# Patient Record
Sex: FEMALE | Race: WHITE
[De-identification: names, ages, dates, MRNs, and addresses within clinical notes are randomized per-mention and may not be internally consistent; named-entity substitution may affect disease eponyms.]

---

## 2017-01-30 ENCOUNTER — HOSPITAL ENCOUNTER (OUTPATIENT)
Dept: HOSPITAL 45 - C.RDSM | Age: 72
Discharge: HOME | End: 2017-01-30
Attending: ORTHOPAEDIC SURGERY
Payer: COMMERCIAL

## 2017-01-30 DIAGNOSIS — M25.511: Primary | ICD-10-CM

## 2017-03-04 ENCOUNTER — HOSPITAL ENCOUNTER (OUTPATIENT)
Dept: HOSPITAL 45 - C.LAB | Age: 72
Discharge: HOME | End: 2017-03-04
Attending: INTERNAL MEDICINE
Payer: COMMERCIAL

## 2017-03-04 DIAGNOSIS — E89.0: Primary | ICD-10-CM

## 2017-03-04 LAB — TSH SERPL-ACNC: 0.07 UIU/ML (ref 0.3–4.5)

## 2017-03-20 ENCOUNTER — HOSPITAL ENCOUNTER (EMERGENCY)
Dept: HOSPITAL 45 - C.EDB | Age: 72
Discharge: HOME | End: 2017-03-20
Payer: COMMERCIAL

## 2017-03-20 VITALS
BODY MASS INDEX: 23.75 KG/M2 | BODY MASS INDEX: 23.75 KG/M2 | HEIGHT: 64.02 IN | HEIGHT: 64.02 IN | WEIGHT: 139.11 LBS | WEIGHT: 139.11 LBS

## 2017-03-20 VITALS — DIASTOLIC BLOOD PRESSURE: 86 MMHG | SYSTOLIC BLOOD PRESSURE: 146 MMHG | OXYGEN SATURATION: 97 % | HEART RATE: 93 BPM

## 2017-03-20 VITALS — TEMPERATURE: 98.24 F

## 2017-03-20 DIAGNOSIS — M85.871: ICD-10-CM

## 2017-03-20 DIAGNOSIS — M79.671: ICD-10-CM
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
## 2017-03-20 DIAGNOSIS — S92.334A: ICD-10-CM
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
## 2017-03-20 DIAGNOSIS — S92.324A: Primary | ICD-10-CM

## 2017-03-20 DIAGNOSIS — S92.344A: ICD-10-CM

## 2017-03-20 NOTE — EMERGENCY ROOM VISIT NOTE
ED Visit Note


First contact with patient:  08:55


CHIEF COMPLAINT:  Right foot pain.





HISTORY OF PRESENT ILLNESS:  Ms. Hernandez is a 72-year old white female who is 

brought via wheelchair into the ED complaining of right foot pain.  


She reports approximately 10-12 hours ago she was walking in a parking lot 

towards her car.  She tripped over read by nissa that was sticking out of the 

ground and injured her foot.  She reports since that time she has been having 

pain over the second, fourth and fifth metatarsals.  She reports at rest it is 

achy and with ambulation and sharp.  She rates her discomfort 2/10 at rest and 6

/10 with ambulation.  Her pain also worsens with palpation.  She has not 

identified any alleviating factors related to the pain.  She reports she has 

used ice and Tylenol with minimal relief of her discomfort.


She denies any associated symptoms including knee pain, lower leg pain, ankle 

pain, worsening leg numbness/tingling, leg/foot weakness.





REVIEW OF SYSTEMS: As noted above.


  


PAST MEDICAL HISTORY: Unspecified urinary problems, kidney stones, thyroidectomy

, acid reflux, rheumatoid arthritis and multiple bony fractures.  


CURRENT MEDICATIONS:  








 Medications  Dose


 Route/Sig


 Max Daily Dose Days Date Category Dose


Instructions


 


 Benadryl Allergy


  (Diphenhydramine


 Hcl) 25 Mg Tab  12.5 Mg


 PO HS


    3/20/17 Reported 


 


 Zoloft


  (Sertraline HCl)


 100 Mg Tab  100 Mg


 PO DAILY


    3/20/17 Reported 


 


 Vitamin D


  (Cholecalciferol)


 2,000 Unit Tab  2,000 Units


 PO DAILY


    3/20/17 Reported 


 


 Synthroid


  (Levothyroxine


 Sodium) 88 Mcg Tab  88 Mcg


 PO DAILY


    3/20/17 Reported 


 


 Calcium 600 Mg Tab  600 Mg


 PO DAILY


    3/20/17 Reported 


 


 Sudafed


  (Pseudoephedrine


 HCl) 30 Mg Tab  30 Mg


 PO QAM PRN


    8/2/16 Reported 


 


 Miralax


  (Polyethylene


 Glycol 3350) 1


 Pow Pow  17 Gm


 PO QAM


    8/2/16 Reported 


 


 Co Q10 (Coenzyme


 Q10) 90 Mg Tab  1 Tab


 PO NOON


    8/2/16 Reported 


 


 Krill Oil 1 Cap


 Cap  1 Cap


 PO NOON


    8/2/16 Reported 


 


 Vitamin E 200


 Unit Cap  200 Inter.unit


 PO NOON


    7/26/16 Reported 


 


 Celecoxib 200 Mg


 Cap  200 Mg


 PO NOON


    7/26/16 Reported  Restart in 3 days if urine clear


 


 Methotrexate 2.5


 Mg Tab  15 Mg


 PO WK


    9/5/14 Reported  TAKE THIS MEDICATION ONCE A WEEK ON TUESDAYS


 


 Vitamin D 1000


 Unit


  (Cholecalciferol)


 1,000 Unit Cap  1,000 Inter.unit


 PO NOON


    9/5/14 Reported 


 


 Acidophilus


  (Lactobacillus) 1


 Cap Cap  1 Cap


 PO NOON


    9/5/14 Reported 


 


 Dexilant


  (Dexlansoprazole)


 60 Mg Cap  60 Mg


 PO HS


    9/5/14 Reported 


 


 Simvastatin 20 Mg


 Tab  20 Mg


 PO HS


    9/5/14 Reported 


 


 Folic Acid 1 Mg


 Tab  1 Mg


 PO NOON


    9/5/14 Reported 


 


 Prednisone 5 Mg


 Tab  1 Tab


 PO BID


    9/5/14 Reported 








ALLERGIES TO MEDICATIONS:  NSAIDs, narcotics, sulfa.


SOCIAL HISTORY:   Patient is currently employed; she feels safe in her home 

environment; she denies tobacco and alcohol use.





PHYSICAL EXAM: 


Vital Signs: 








  Date Time  Temp Pulse Resp B/P Pulse Ox O2 Delivery O2 Flow Rate FiO2


 


3/20/17 10:34  93 16 146/86 97   


 


3/20/17 08:46 36.8 94 18 146/83 97 Room Air  





General: 72 year old female in mild distress due to pain, nontoxic-appearing, 

afebrile and hemodynamically stable.


Neurological: Awake, alert, oriented to person place and time.  Answering 

questions appropriately and following commands.


Skin: Warm dry and pink.  No soft tissue injuries.


Right Lower Extremity:  No gross bart deformities.  No tenderness in the knee, 

lower leg or ankle.  Moderate tenderness over the second, third and fourth 

metatarsals without bony deformity or crepitus but there is swelling and 

ecchymosis.  She was able to easily wiggle her toes. Throughout the foot the 

skin is pink and warm with brisk capillary refill.  Able to distinguish light 

sensations through all dermatomes of the foot.





ED COURSE:  


Patient is assessed as noted above.


Right Foot X-Rays: Were read by myself and the radiologist showing slightly 

angulated fractures within the neck of the second, third and fourth metatarsals.


Patient is given 1 g of Tylenol by mouth and ice for pain, swelling and comfort.


Patient is placed in a walking boot and is instructed on her walker use.


Patient is educated about her condition and instructed on her treatment plan; 

she verbalizes understanding and agreement with the our plan.





CLINICAL IMPRESSION:  Fractures of the right second, third and fourth 

metatarsals.





DISPOSITION: Patient is discharged to home in stable condition; prior to 

departure she was reassessed and subjectively reported she was feeling better 

and rated her discomfort 2/10.





PLAN:


Comfort measures were discussed with the patient.


Patient was encouraged to follow-up with an orthopedic physician for definitive 

care and treatment.


Patient was encouraged to return emergency department as needed for increasing 

pain or swelling, foot weakness/numbness/tingling or any new/concerning 

symptoms.

## 2017-03-20 NOTE — DIAGNOSTIC IMAGING REPORT
RIGHT FOOT 3 VIEWS



HISTORY:      FOOT PAIN

Right



COMPARISON: None.



FINDINGS: Small plantar heel spur. Tiny ossific density adjacent to the anterior

tibial plafond favors an old avulsion injury. Dorsal soft tissue swelling within

the forefoot. The bones are osteopenic. The Lisfranc joint remains intact.

Slightly angulated fractures within the necks of the second through fourth

metatarsals. No dislocation. Deformity within the proximal phalanx of the fourth

toe favors an old, healed fracture. No radiopaque foreign bodies.



IMPRESSION:  

Slightly angulated fractures within the necks of the second through fourth

metatarsals.







Electronically signed by:  Maik Alvarado M.D.

3/20/2017 9:38 AM



Dictated Date/Time:  3/20/2017 9:36 AM

## 2017-04-11 ENCOUNTER — HOSPITAL ENCOUNTER (OUTPATIENT)
Dept: HOSPITAL 45 - C.MAMM | Age: 72
Discharge: HOME | End: 2017-04-11
Attending: OBSTETRICS & GYNECOLOGY
Payer: COMMERCIAL

## 2017-04-11 DIAGNOSIS — Z12.31: Primary | ICD-10-CM

## 2017-04-11 DIAGNOSIS — N63: ICD-10-CM

## 2017-04-12 NOTE — MAMMOGRAPHY REPORT
BILATERAL DIGITAL SCREENING MAMMOGRAM TOMOSYNTHESIS WITH CAD: 4/11/2017

CLINICAL HISTORY: Routine screening examination.  





TECHNIQUE: Bilateral breast tomosynthesis in addition to standard 2D mammography was performed. Curr
ent study was also evaluated with a Computer Aided Detection (CAD) system.  



COMPARISON: Comparison is made to exams dated:  4/6/2016 mammogram, 4/6/2015 mammogram, 10/19/2015 m
ammogram, 4/17/2015 ultrasound, 4/2/2014 mammogram, and 3/29/2013 mammogram - [facility].   



BREAST COMPOSITION:  There are scattered areas of fibroglandular density in both breasts.  



FINDINGS:  There is a newly visualized lobulated 11 mm mass in the anterior upper outer left breast,
 and a newly visualized lobulated 9 mm mass in the approximate 9:00 middle one third of the left iftikhar
ast, for which additional targeted ultrasound and possible additional mammographic views are recomme
nded, although they could represent cysts.



There are moderate vascular calcifications and scattered benign rim calcifications in the breasts. N
o other suspicious mass, architectural distortion or cluster of microcalcifications is seen.  



IMPRESSION:  ACR BI-RADS CATEGORY 0: INCOMPLETE EVALUATION:  NEED ADDITIONAL IMAGING EVALUATION

The two newly visualized masses in the left breast need additional imaging evaluation.

The patient will be called to schedule an appointment.  





Approximately 10% of breast cancers are not detected with mammography. A negative mammographic repor
t should not delay biopsy if a clinically suggestive mass is present.



Vanessa Greenwood M.D.          

ay/:4/11/2017 17:45:07  



Imaging Technologist: Azra CHRISTINA(R)(M), [facility]

letter sent: Addl Imaging 0  

BI-RADS Code: ACR BI-RADS Category 0: Incomplete Evaluation:  Need Additional Imaging Evaluation

## 2017-04-17 ENCOUNTER — HOSPITAL ENCOUNTER (OUTPATIENT)
Dept: HOSPITAL 45 - C.ULTRBC | Age: 72
Discharge: HOME | End: 2017-04-17
Attending: UROLOGY
Payer: COMMERCIAL

## 2017-04-17 DIAGNOSIS — N81.6: Primary | ICD-10-CM

## 2017-04-17 NOTE — DIAGNOSTIC IMAGING REPORT
RENAL ULTRASOUND



HISTORY: Pain N81.6 [id number]



COMPARISON:  None.



FINDINGS:



Right kidney: Maximum dimension 11.0 cm. 1 cm lower pole renal calcification.

Several small right renal cyst. 5 cm cyst in the interpolar region of the right

kidney. Normal corticomedullary differentiation and cortical thickness.



Left kidney:  Maximum dimension 10.5 cm. No evidence for hydronephrosis. Normal

corticomedullary differentiation and cortical thickness.



Bladder: No bladder wall thickening. The bilateral ureteral jets were

identified.



IMPRESSION:  



1. No evidence for hydronephrosis.

2. 1 cm nonobstructing lower pole right renal calcification.





3. 5 cm right renal parapelvic cyst.







Electronically signed by:  Flaco De M.D.

4/17/2017 7:49 AM



Dictated Date/Time:  4/17/2017 7:47 AM

## 2017-04-24 ENCOUNTER — HOSPITAL ENCOUNTER (OUTPATIENT)
Dept: HOSPITAL 45 - C.MAMM | Age: 72
Discharge: HOME | End: 2017-04-24
Attending: OBSTETRICS & GYNECOLOGY
Payer: COMMERCIAL

## 2017-04-24 DIAGNOSIS — N63: Primary | ICD-10-CM

## 2017-04-24 DIAGNOSIS — Z80.3: ICD-10-CM

## 2017-04-24 DIAGNOSIS — Z80.41: ICD-10-CM

## 2017-04-24 DIAGNOSIS — N60.02: ICD-10-CM

## 2017-04-24 NOTE — MAMMOGRAPHY REPORT
ULTRASOUND OF BOTH BREASTS: 4/24/2017

CLINICAL HISTORY: 72-year-old woman called back from screening mammography for two newly visualized 
masses within the left breast.  Family history of breast cancer = 2 sisters.  Also another sister wi
th ovarian cancer.  





COMPARISON: Comparison is made to exams dated:  4/11/2017 mammogram, 4/6/2016 mammogram, 4/6/2015 ma
mmogram, 4/2/2014 mammogram, 3/29/2013 mammogram, and 3/23/2012 mammogram - [facility]
nter.   



FINDINGS: Targeted ultrasound was performed in the approximate 1:00 to 2:00 and 9:00 to 10:00 axes o
f the left breast, to evaluate for the newly visualized mammographic masses.  In the 1:00 periareola
r left breast, there is a mixed echogenicity predominantly hypoechoic solid-appearing mass measuring
 11.8 x 8.8 x 10.4 mm.  This correlates with the newly visualized mass in the anterior aspect of the
 left breast.  This is indeterminate, warranting further evaluation with tissue sampling.  In the 10
:00 left breast, 2 cm from the nipple, there is a parallel lobulated anechoic cystic mass measuring 
5.7 x 2.8 x 7.6 mm.  This correlates with the second mammographic mass, and is benign.  Sonographic 
evaluation was also performed in the left axilla.  Morphologically normal lymph nodes are seen witho
ut a suspicious mass or suspicious adenopathy.





IMPRESSION: ACR BI-RADS CATEGORY 4B: INTERMEDIATE SUSPICION FOR MALIGNANCY - FOLLOW-UP RECOMMENDED

1.  Ultrasound guided core needle biopsy is recommended for a mixed echogenicity 11.8 mm mass in the
 1:00 periareolar left breast, thought to correlate with one of the newly visualized mammographic ma
sses.

2.  The second newly visualized mammographic mass in the 9:00 to 10:00 left breast correlates with a
 benign anechoic simple cyst on ultrasound.

3.  No suspicious left axillary lymphadenopathy is seen on ultrasound.



These results and recommendations were discussed with the patient at the time of the exam.  She tent
atively scheduled the biopsy prior to leaving our department.



Vanessa Greenwood M.D.  

ay/:4/24/2017 11:48:54  



Imaging Technologist: Dr. Vanessa Greenwood, [facility]

letter sent: Abnormal 4/5  

BI-RADS Code: ACR BI-RADS Category 4B: Intermediate Suspicion For Malignancy

## 2017-04-25 ENCOUNTER — HOSPITAL ENCOUNTER (OUTPATIENT)
Dept: HOSPITAL 45 - C.MAMM | Age: 72
Discharge: HOME | End: 2017-04-25
Attending: OBSTETRICS & GYNECOLOGY
Payer: COMMERCIAL

## 2017-04-25 ENCOUNTER — HOSPITAL ENCOUNTER (OUTPATIENT)
Dept: HOSPITAL 45 - C.LABSPEC | Age: 72
Discharge: HOME | End: 2017-04-25
Attending: UROLOGY
Payer: COMMERCIAL

## 2017-04-25 DIAGNOSIS — N63: Primary | ICD-10-CM

## 2017-04-25 DIAGNOSIS — N39.46: ICD-10-CM

## 2017-04-25 DIAGNOSIS — N20.0: Primary | ICD-10-CM

## 2017-04-25 NOTE — MAMMOGRAPHY REPORT
UNILATERAL LEFT DIGITAL DIAGNOSTIC MAMMOGRAM TOMOSYNTHESIS: 4/25/2017

CLINICAL HISTORY: Indeterminate mixed echogenicity solid mass in the 1:00 periareolar left breast.  
Patient presented for ultrasound-guided core needle biopsy.  



Please refer to the report from left breast ultrasound guided core biopsy performed at the same time
 for full detail.



IMPRESSION:  POST PROCEDURE IMAGING FOR MARKER PLACEMENT

Please refer to the report from left breast ultrasound guided core biopsy performed at the same time
 for full detail.



Approximately 10% of breast cancers are not detected with mammography. A negative mammographic repor
t should not delay biopsy if a clinically suggestive mass is present.



Vanessa Greenwood M.D.          

ay/:4/25/2017 15:58:14  



Imaging Technologist: Bassam CHRISTINA(R)(M), [facility]



BI-RADS Code: Post Procedure Imaging For Marker Placement

## 2017-04-25 NOTE — DISCHARGE INSTRUCTIONS
Discharge Instructions


Procedure


Procedure Date:


Apr 25, 2017.


Reason for visit:


Left Mass.





Discharge


Discharge Date:


Apr 25, 2017.


Discharge Diagnosis:


post left breast ultrasound guided core biopsy





Instructions


Activity Recommendations:  Additional Limitations (see below)


Return to School/Work:  no limitations


Recommended Home Diet:  No Limitations


Provider Instructions:





ACTIVITY RECOMMENDATIONS:





*  No lifting, pushing, pulling or exercising the affected side for three days.








RETURN TO SCHOOL/WORK:





*  You may return to work/school after the procedure, but do not perform any 

strenuous


   activities for 24 to 48 hours.








MEDICATIONS:





*  Tylenol (two 325 mg) every four to six hours if needed for mild pain (if not 

allergic to Tylenol).








DIET:





*  Resume previous diet.








SPECIAL CARE INSTRUCTIONS:





*  Keep biopsy site dry for 24 hours.  May shower after 24 hours, but do not 

soak (bathe)


   incision.





*  May remove Tegaderm (plastic patch) tomorrow AFTER showering.





*  Leave the steri-strips on for one week.  Allow the steri-strips to fall off 

by themselves.  


   If not off after one week, you may remove them.  You may place a Bandaid


   crosswise over the strips, if desired.





*  Apply ice 10 minutes on and 10 minutes off as needed.





*  Wear a bra at bedtime to sleep more comfortably for 2-3 days.





*  Your referring physician should have the results after approximately 5 to 7 

business days.





*  Call for unusual bleeding, fever, drainage, etc or if you have any questions 

call


    [phone] during normal business hours or after hours call Dr Greenwood, 

[phone].








FOLLOW UP VISIT:





Follow-up with Referring Physician as scheduled.





Allergies


Coded Allergies:  


     NSAIDs (Verified  Adverse Reaction, Unknown, NAUSEA, 9/28/16)


     Sulfa Antibiotics (Verified  Adverse Reaction, Unknown, "SULFA DRUGS": 

NAUSEA, 9/28/16)


Uncoded Allergies:  


     NARCOTICS (Adverse Reaction, Mild, NAUSEA, VOMITING, 7/26/16)


Reina Delgadillo Recommendations:


 


Call your doctor if:


*  Temperature above 101 degrees


*  Pain not relieved by pain medicine ordered


*  There is increased drainage or redness from any incision


*  You have any unanswered questions or concerns.





Your Doctors Instructions noted above were prepared by provider Vanessa Greenwood.


Patient Signature Section:


 Patient Instructions Signature Page








Sara Hernandez 











Patient (or Guardian) Signature/Date:____________________________________ I 

have read and understand the instructions given to me by my caregivers.








Caregiver/RN/Doctor Signature/Date:____________________________________








The above-named patient and/or guardian has received patient instructions on 

this date.


























+  Original Patient Signature Page (only) stays with chart.  Please make copy 

for patient.

## 2017-04-25 NOTE — MAMMOGRAPHY REPORT
THIS REPORT HAS BEEN AMENDED.  

ULTRASOUND GUIDED BIOPSY LEFT BREAST: 4/25/2017

CLINICAL HISTORY: Indeterminate mixed echogenicity solid 11.8 mm mass in the 1:00 periareolar left b
reast.  Patient presented for ultrasound-guided core needle biopsy.  



COMPARISON: Comparison is made to exams dated:  4/24/2017 ultrasound, 4/11/2017 mammogram, 4/6/2016 
mammogram, 4/6/2015 mammogram, 4/2/2014 mammogram, and 3/29/2013 mammogram - [facility]
enter. 



PATIENT CONSENT: The procedure, risks and benefits were discussed with the patient and informed writ
ten consent was obtained. Specific risks to this procedure include: bleeding, infection, puncture of
 adjacent structure, nontarget biopsy, sampling error, metal allergy and medication reaction.



PROCEDURE DESCRIPTION: A time out was performed and the left breast was agreed as the site of biopsy
. The skin was prepped and draped in the usual sterile fashion. The solid, mixed echogenicity 11.8 m
m mass in the 1:00 periareolar left breast was chosen as the target for biopsy. Subcutaneous and int
raparenchymal 1% buffered lidocaine was administered as local anesthesia. A skin incision was made. 
 Through the incision, 5 samples were taken with a 14 gauge Achieve biopsy device. A metallic marker
 was placed at the biopsy site. Hemostasis was achieved after manual compression. The patient tolera
ly the procedure well and there was no immediate complication.  



Postprocedure left CC and ML to the digital and tomosynthesis images were obtained.  There is a new 
ribbon-shaped metallic biopsy marker within the mammographic mass in question in the 1:00 periareola
r left breast.  No significant postbiopsy hematoma is identified.





IMPRESSION: ULTRASOUND GUIDED BIOPSY

Status post ultrasound guided core needle biopsy of an indeterminate mixed echogenicity solid mass i
n the 1:00 periareolar left breast, with biopsy marker placed at the site.



The patient will receive notification of the biopsy results from her referring physician.





Vanessa Greenwood M.D.  

ay/:4/25/2017 16:01:01  



Attending Technologist: Bassam MOROCHO)(MILENA), [facility]

Imaging Technologist: Dr. Vanessa Greenwood, [facility]









AMENDMENT: 5/3/2017   Vanessa Timo, M.D. 

Pathology results from the ultrasound guided core needle biopsy of a mixed echogenicity solid mass i
n the 1:00 left breast yielded invasive ductal carcinoma, Murdock grade 2 of 3.  Estrogen recepto
r positive, progesterone receptor negative, HER-2/skyler indeterminate.  The pathology results are conc
ordant with the imaging appearance.



Given the new diagnosis of left breast cancer, mammographic nodularity bilaterally, and strong famil
y history of breast cancer, consider further evaluation with a breast MRI prior to definitive treatm
ent.

## 2017-04-29 ENCOUNTER — HOSPITAL ENCOUNTER (OUTPATIENT)
Dept: HOSPITAL 45 - C.LAB | Age: 72
Discharge: HOME | End: 2017-04-29
Attending: FAMILY MEDICINE
Payer: COMMERCIAL

## 2017-04-29 DIAGNOSIS — E78.5: Primary | ICD-10-CM

## 2017-04-29 LAB
CHOLEST/HDLC SERPL: 2.7 {RATIO}
GLUCOSE UR QL: 72 MG/DL
KETONES UR QL STRIP: 99 MG/DL
NITRITE UR QL STRIP: 108 MG/DL (ref 0–150)
PH UR: 193 MG/DL (ref 0–200)
VERY LOW DENSITY LIPOPROT CALC: 22 MG/DL

## 2017-08-03 ENCOUNTER — HOSPITAL ENCOUNTER (OUTPATIENT)
Dept: HOSPITAL 45 - C.LAB | Age: 72
Discharge: HOME | End: 2017-08-03
Attending: INTERNAL MEDICINE
Payer: COMMERCIAL

## 2017-08-03 DIAGNOSIS — E89.0: Primary | ICD-10-CM

## 2017-08-03 LAB — TSH SERPL-ACNC: 0.24 UIU/ML (ref 0.3–4.5)

## 2017-10-20 ENCOUNTER — HOSPITAL ENCOUNTER (OUTPATIENT)
Dept: HOSPITAL 45 - C.RAD | Age: 72
Discharge: HOME | End: 2017-10-20
Attending: UROLOGY
Payer: COMMERCIAL

## 2017-10-20 DIAGNOSIS — N81.4: Primary | ICD-10-CM

## 2017-10-20 NOTE — DIAGNOSTIC IMAGING REPORT
KUB



CLINICAL HISTORY: Uterine prolapse.    



COMPARISON STUDY:  KUB August 15, 2016. 



FINDINGS: There is a suspected 3 mm right renal calculus. A 7 mm right pelvic

calcification could reflect a distal right ureteral calculus or phlebolith.

Additional pelvic calcifications reflect phleboliths and vascular

calcifications. There is no evidence for a bowel obstruction. A pessary is in

place.



IMPRESSION:  



1. 7 mm right pelvic calcification which could reflect a distal right ureteral

calculus or phlebolith. 



2. Suspected 3 mm right renal calculus.







Electronically signed by:  Surya Medrano M.D.

10/20/2017 6:03 PM



Dictated Date/Time:  10/20/2017 5:59 PM

## 2017-10-24 ENCOUNTER — HOSPITAL ENCOUNTER (OUTPATIENT)
Dept: HOSPITAL 45 - C.LABSPEC | Age: 72
Discharge: HOME | End: 2017-10-24
Attending: UROLOGY
Payer: COMMERCIAL

## 2017-10-24 DIAGNOSIS — R31.29: Primary | ICD-10-CM

## 2017-10-26 ENCOUNTER — HOSPITAL ENCOUNTER (OUTPATIENT)
Dept: HOSPITAL 45 - C.ONC | Age: 72
Discharge: HOME | End: 2017-10-26
Attending: PHYSICIAN ASSISTANT
Payer: COMMERCIAL

## 2017-10-26 VITALS
DIASTOLIC BLOOD PRESSURE: 72 MMHG | SYSTOLIC BLOOD PRESSURE: 125 MMHG | HEART RATE: 57 BPM | TEMPERATURE: 98.42 F | OXYGEN SATURATION: 97 %

## 2017-10-26 DIAGNOSIS — Z08: Primary | ICD-10-CM

## 2017-10-26 DIAGNOSIS — Z92.3: ICD-10-CM

## 2017-10-26 DIAGNOSIS — Z85.3: ICD-10-CM

## 2017-10-27 NOTE — RADIATION ONCOLOGY FOLLOW-UP
Radiation Oncology Follow-Up


Date of Visit


Oct 26, 2017.





Reason For Visit


One-month follow-up in cancer survivorship care plan





Radiation Completion Date


9/21/17





Diagnosis





(1) Breast cancer of upper-outer quadrant of left female breast


Status:  Acute        Onset Date:  4/25/2017


Histology Subtype:  ductal


Stage:  l (A)


Permanent Comment:  Abnormal left breast mammogram


Status post ultrasound-guided core needle biopsy 04/25/2017 revealing ductal 

carcinoma grade 2 Estrogen receptor positive, progesterone receptor negative, 

HER-2/skyler negative


Status post wire-guided needle localization lumpectomy and sentinel lymph node 

biopsy 06/30/2017


Stage pT1c pN0M0


Oncotype DX score of 21


Status post completion of radiation therapy 09/21/2017.  She received 5130 cGy  

Last Edited By: Carisa Junior on Sep 29, 2017 10:01





History of Present Illness


Ms. Hernandez is a postmenopausal female who presented with an abnormal mammogram 

on 04/11/2017 which revealed 2 newly visualized breast masses in the left breast

; one mass was noted in the anterior upper outer left breast and measured 11 mm 

and another mass measured 9 mm in the 9 o'clock position of the left breast.  

The patient was brought back for targeted ultrasound of both breast masses in 

the left breast on 04/24/2017 which confirmed a concerning lesion in the 1 o'

clock position that measured 11.8 mm while the second mass in the 9 o'clock 

position was most likely felt to be a simple cyst.  The patient underwent 

targeted ultrasound guided core biopsy of the left breast mass on 04/25/2017 

which revealed invasive ductal carcinoma that was grade 2 and was estrogen 

receptor positive, progesterone receptor negative and HER-2 negative.  The 

patient was seen and evaluated at [facility] and was 

seen in the multidisciplinary breast clinic.  She was seen and evaluated by Dr. Fisher (medical oncology), Dr. Cardoza (radiation oncology) and Dr. Stewart (

breast surgery).  Ultimately, she elected to undergo breast conserving therapy.





The patient underwent a left breast lumpectomy and sentinel lymph node biopsy 

on 06/30/2017 by Dr. Stewart.  Pathology revealed invasive ductal carcinoma that 

was grade 2 with no evidence of lymphovascular space invasion or ductal 

carcinoma in situ; the tumor measured 1.5 cm in the greatest dimension and was 

unifocal and unicentric.  The superior margin was initially positive however 

reexcision was completed during the same procedure and the superior margin was 

negative making all margins negative.  5 sentinel lymph nodes were resected and 

were negative for metastatic carcinoma.  The final pathologic stage was T1cN0(sn

), stage IA.  The patient's surgical pathology specimen was sent down for 

Oncotype DX in the results were recently completed in her Oncotype recurrence 

score was 21 which placed her into the intermediate risk category.  The patient 

has not met with Dr. Fisher in follow-up evaluation to discuss consideration of 

chemotherapy and anti-hormonal therapy.  We are now seeing the patient in 

consultation discussed the role of radiation therapy.





She underwent a CT simulation.  She was found to be a candidate for hypo-

fractionation.  Her radiation was completed 09/21/2017.  She received 5130 cGy.





Interim History


She denies any changes of the breast over the past month.  She has noted no 

masses or tenderness and no change in the axilla.  She's had no swelling of her 

arm.  She has seen her medical oncologist in follow-up and has started anti-

estrogen therapy.  She is taking anastrozole.  She feels that she may be having 

some side effects.  She has a feeling abdominal fullness in the epigastric 

area.  She has gained 10 pounds.  She feels there is some shortness of breath 

and she has discomfort when bending over.  She has sent an email to the medical 

oncologist and is awaiting a response.  When she saw a medical oncologist she 

was set up for a follow-up appointment to have mammography at [place].  She'll 

also see her breast surgeon on that day.  She continues follow-up with the pain 

clinic regards to her chronic back pain.  She has spinal injections.





Allergies


Coded Allergies:  


     NSAIDs (Verified  Adverse Reaction, Unknown, NAUSEA, 9/28/16)


     Sulfa Antibiotics (Verified  Adverse Reaction, Unknown, "SULFA DRUGS": 

NAUSEA, 9/28/16)


Uncoded Allergies:  


     NARCOTICS (Adverse Reaction, Mild, NAUSEA, VOMITING, 7/26/16)





Home Medications


Scheduled


Anastrozole (Anastrozole), 1 TAB PO DAILY


Aspirin (Aspirin Ec), 81 MG PO DAILY


Calcium (Calcium), 600 MG PO DAILY


Celecoxib (Celecoxib), 200 MG PO NOON


Cholecalciferol (Vitamin D), 2,000 UNITS PO DAILY


Coenzyme Q10 (Ubidecarenone) (Coq10), 1 CAP PO DAILY


Dexlansoprazole (Dexilant), 60 MG PO HS


Diphenhydramine Hcl (Benadryl Allergy), 25 MG PO HS


Folic Acid (Folic Acid), 1 MG PO NOON


Guaifenesin Ext Rel (Mucinex Ext Rel), 600 MG PO DAILY


Krill Oil (Krill Oil), 1 CAP PO NOON


Lactobacillus (Acidophilus), 1 CAP PO BID


Levothyroxine Sodium (Synthroid), 88 MCG PO DAILY


Methotrexate (Methotrexate), 15 MG PO WK


Metoprolol Tartrate (Lopressor) (Lopressor), 50 MG PO BID


Polyethylene Glycol 3350 (Miralax), 17 GM PO QAM


Prednisone (Prednisone), 0.5 TAB PO BID


Sertraline (Zoloft), 100 MG PO DAILY


Simvastatin (Simvastatin), 20 MG PO HS


Vitamin E (Vitamin E), 200 INTER.UNIT PO NOON





Review of Systems


Gastrointestinal:  


   Symptoms:  WNL, Constipation


   GI Comments:  Chronic Constipation - Miralax Daily


Oral:  


   Symptoms:  No Problems


Respiratory:  


   Symptoms:  SOB With Exertion


   Respiratory Comments:  Trouble with tying shoes and bending over


   Other Respiratory:  Has trouble taking breaths at time due to weight gain


Urinary:  


   Symptoms:  WNL


   Comments:  Currently passing kidney stones


Skin:  


   Symptoms:  No Problems


Breast:  


   Right Upper Arm Measurement:  25.4


   Right Mid Arm Measurement:  22.1


   Right Wrist Measurement:  15.6


   Left Upper Arm Measurement:  26.0


   Left Mid Arm Measurement:  21.1


   Left Wrist Measurement:  16.0


   Arm Dominence:  Right


Additional Notes:


She completed a distress management report and answered "no" to all questions.





Physical Exam





Vital Signs








  Date Time  Temp Pulse Resp B/P (MAP) Pulse Ox O2 Delivery O2 Flow Rate FiO2


 


10/26/17 15:11 36.9 57 16 125/72 97   








Fatigue:  None


General Appearance:  no apparent distress


Eyes:  normal inspection, EOMI


ENT:  normal ENT inspection, hearing grossly normal


Neck:  no adenopathy, thyroid normal


Respiratory/Chest:  lungs clear, no respiratory distress, no accessory muscle 

use


Breast:


Breast examination reveals well-healed incisions of the left breast.  There are 

no masses or tenderness and no axillary adenopathy.  She has minimal 

hyperpigmentation.  There is no edema.  She has no skin retractions or nipple 

changes.  Using the Mulberry score cosmesis she has a good outcome.  The right 

breast showed no masses or tenderness and no axillary adenopathy.


Cardiovascular:  regular rate, rhythm, no gallop, no murmur


Abdomen:  non tender


Extremities:  no pedal edema


Neurologic/Psychiatric:  no motor/sensory deficits, alert, normal mood/affect


Skin:  warm/dry





Laboratory Studies











Test


  8/3/17


12:04


 


Thyroid Stimulating Hormone


(TSH) 0.237 uIu/ml


(0.300-4.500)


 


Free Thyroxine


  1.11 ng/dl


(0.80-1.60)











Assessment & Plan


Plan: She'll be seeing her breast surgeon and medical oncologist on 05/16/2018.

  She'll also have a mammogram on that day.  She was seen and examined by Dr. Olivera.  Today we completed a cancer survivorship care plan.  A copy than 

document was given to the patient.  She was given a survivorship booklet.  She 

understands that she'll be followed with breast exams and mammograms.  The 

medical oncologist had wanted to see her sooner she was concerned about the 

weather and pushed back the appointment till May.  Because of the symptoms that 

she is having she has sent an email to her medical oncologist.  We also ask her 

to contact her primary care physician if she continues to have symptoms.  We 

asked her to return to our office in 6 months.  She may call if she has any 

questions or concerns in the interim.





Assessment & Plan (Attending)


ADDENDUM: I agree with note created by Carisa Junior PA-C. I reviewed the 

patient's chart and information with her.  I have examined and evaluated the 

patient. I reviewed relevant clinical information and answered the patient's and

/or family's questions. 





Total Time


In Follow-Up


I spent 20 minutes speaking to the patient and performing examination.  I spent 

20 minutes reviewing information, preparing the survivorship document, and 

completing this note.





Total Time (Attending)


In Follow-Up


I spent 15 minutes examining and counseling the patient. 





Copy To


Concepcion Stewart M.D.; Di Fisher M.D.; Renu Lim M.D.





Problem Qualifiers





(1) Breast cancer of upper-outer quadrant of left female breast:  


Estrogen receptor status:  positive  Qualified Codes:  C50.412 - Malignant 

neoplasm of upper-outer quadrant of left female breast; Z17.0 - Estrogen 

receptor positive status [ER+]

## 2017-11-15 ENCOUNTER — HOSPITAL ENCOUNTER (OUTPATIENT)
Dept: HOSPITAL 45 - C.LABBC | Age: 72
Discharge: HOME | End: 2017-11-15
Attending: NURSE PRACTITIONER
Payer: COMMERCIAL

## 2017-11-15 DIAGNOSIS — N39.0: Primary | ICD-10-CM

## 2017-11-15 DIAGNOSIS — N20.0: ICD-10-CM

## 2017-11-15 LAB
BUN SERPL-MCNC: 24 MG/DL (ref 7–18)
BUN/CREAT SERPL: 32 (ref 10–20)
CREAT SERPL-MCNC: 0.75 MG/DL (ref 0.6–1.2)

## 2017-11-27 ENCOUNTER — HOSPITAL ENCOUNTER (OUTPATIENT)
Dept: HOSPITAL 45 - C.LABBC | Age: 72
Discharge: HOME | End: 2017-11-27
Attending: INTERNAL MEDICINE
Payer: COMMERCIAL

## 2017-11-27 DIAGNOSIS — E89.0: Primary | ICD-10-CM

## 2017-11-27 LAB — TSH SERPL-ACNC: 0.33 UIU/ML (ref 0.3–4.5)

## 2017-12-13 ENCOUNTER — HOSPITAL ENCOUNTER (OUTPATIENT)
Dept: HOSPITAL 45 - C.MRIBC | Age: 72
Discharge: HOME | End: 2017-12-13
Attending: OTOLARYNGOLOGY
Payer: COMMERCIAL

## 2017-12-13 DIAGNOSIS — H90.42: Primary | ICD-10-CM

## 2017-12-13 NOTE — DIAGNOSTIC IMAGING REPORT
BRAIN COMBO FOR IAC



CLINICAL HISTORY: Asymmetrical left sensorineural neural hearing loss.    



COMPARISON STUDY:  Head CT August 11, 2010.



TECHNIQUE: Utilizing a 1.5 Amairani magnet and dedicated coil, multiplanar, multi

echo imaging of the brain was performed pre and postcontrast administration with

thin cut imaging through the internal auditory canals. Injection of 6.5 cc of

Gadavist IV was uneventful.



FINDINGS: There are no foci of restricted diffusion. No acute intracranial

hemorrhage, midline shift or mass effect is present. Brain volume is normal.

Ventricular system is normal. Basilar cisterns are patent. There are no

extra-axial collections. Flow-voids for the major intracranial vessels are

present. There is no intracranial mass or pathologic enhancement. No mass or

abnormal enhancement is identified within the internal auditory canals.

Semicircular canals appear intact. A small amount of fluid within the posterior

inferior right mastoid air cells is similar to head CT of August 11, 2010. There

is no left mastoid effusion. Orbits are unremarkable. Calvarial signal is

maintained. Mild white matter T2 hyperintensity suggests small vessel disease.



IMPRESSION:  



1. No acute intracranial findings.



2. No abnormalities within the internal auditory canals.



3. Small amount of fluid within the right mastoid air cells which is similar to

head CT of August 11, 2010.



4. Mild small vessel disease. 









Electronically signed by:  Surya Medrano M.D.

12/13/2017 2:53 PM



Dictated Date/Time:  12/13/2017 2:48 PM

## 2018-01-11 ENCOUNTER — HOSPITAL ENCOUNTER (OUTPATIENT)
Dept: HOSPITAL 45 - C.LABSPEC | Age: 73
Discharge: HOME | End: 2018-01-11
Attending: OBSTETRICS & GYNECOLOGY
Payer: COMMERCIAL

## 2018-01-11 DIAGNOSIS — R30.0: Primary | ICD-10-CM

## 2018-01-19 ENCOUNTER — HOSPITAL ENCOUNTER (OUTPATIENT)
Dept: HOSPITAL 45 - C.LABBC | Age: 73
Discharge: HOME | End: 2018-01-19
Attending: OBSTETRICS & GYNECOLOGY
Payer: COMMERCIAL

## 2018-01-19 DIAGNOSIS — R30.0: Primary | ICD-10-CM

## 2018-03-05 ENCOUNTER — HOSPITAL ENCOUNTER (OUTPATIENT)
Dept: HOSPITAL 45 - C.LABBC | Age: 73
Discharge: HOME | End: 2018-03-05
Attending: FAMILY MEDICINE
Payer: COMMERCIAL

## 2018-03-05 DIAGNOSIS — R51: Primary | ICD-10-CM

## 2018-03-05 LAB
BASOPHILS # BLD: 0.06 K/UL (ref 0–0.2)
BASOPHILS NFR BLD: 0.6 %
EOS ABS #: 0.04 K/UL (ref 0–0.5)
EOSINOPHIL NFR BLD AUTO: 352 K/UL (ref 130–400)
HCT VFR BLD CALC: 44.1 % (ref 37–47)
HGB BLD-MCNC: 14.3 G/DL (ref 12–16)
IG#: 0.02 K/UL (ref 0–0.02)
IMM GRANULOCYTES NFR BLD AUTO: 17.4 %
LYMPHOCYTES # BLD: 1.71 K/UL (ref 1.2–3.4)
MCH RBC QN AUTO: 31.4 PG (ref 25–34)
MCHC RBC AUTO-ENTMCNC: 32.4 G/DL (ref 32–36)
MCV RBC AUTO: 96.9 FL (ref 80–100)
MONO ABS #: 0.54 K/UL (ref 0.11–0.59)
MONOCYTES NFR BLD: 5.5 %
NEUT ABS #: 7.45 K/UL (ref 1.4–6.5)
NEUTROPHILS # BLD AUTO: 0.4 %
NEUTROPHILS NFR BLD AUTO: 75.9 %
PMV BLD AUTO: 10.6 FL (ref 7.4–10.4)
RED CELL DISTRIBUTION WIDTH CV: 14.3 % (ref 11.5–14.5)
RED CELL DISTRIBUTION WIDTH SD: 50.3 FL (ref 36.4–46.3)
WBC # BLD AUTO: 9.82 K/UL (ref 4.8–10.8)

## 2018-04-30 ENCOUNTER — HOSPITAL ENCOUNTER (OUTPATIENT)
Dept: HOSPITAL 45 - C.LAB | Age: 73
Discharge: HOME | End: 2018-04-30
Attending: INTERNAL MEDICINE
Payer: COMMERCIAL

## 2018-04-30 DIAGNOSIS — E89.0: Primary | ICD-10-CM

## 2018-05-18 ENCOUNTER — HOSPITAL ENCOUNTER (OUTPATIENT)
Dept: HOSPITAL 45 - C.RADBC | Age: 73
Discharge: HOME | End: 2018-05-18
Attending: UROLOGY
Payer: COMMERCIAL

## 2018-05-18 DIAGNOSIS — N39.46: Primary | ICD-10-CM

## 2018-05-18 NOTE — DIAGNOSTIC IMAGING REPORT
KUB (2 views)



CLINICAL HISTORY: N39.46 Mixed stress and urge urinary [id number]   





COMPARISON STUDY:  11/12/2017 



FINDINGS: There is no pathologic bowel dilatation. There are scattered colonic

stool. There are no calcifications suspicious for renal calculi. There is debris

within the stomach. Pelvic basin calcifications likely represent phleboliths. A

pessary ring is visualized



IMPRESSION:  

1. No evidence of pathologic bowel dilatation

2. No urinary tract calculi identified on conventional radiographic imaging 







Electronically signed by:  Clem Beaulieu M.D.

5/18/2018 5:01 PM



Dictated Date/Time:  5/18/2018 5:00 PM

## 2018-08-16 ENCOUNTER — HOSPITAL ENCOUNTER (OUTPATIENT)
Dept: HOSPITAL 45 - C.CPL | Age: 73
Discharge: HOME | End: 2018-08-16
Attending: ORTHOPAEDIC SURGERY
Payer: COMMERCIAL

## 2018-08-16 DIAGNOSIS — Z01.812: ICD-10-CM

## 2018-08-16 DIAGNOSIS — M75.120: ICD-10-CM

## 2018-08-16 DIAGNOSIS — Z01.810: Primary | ICD-10-CM

## 2018-08-16 LAB
BASOPHILS # BLD: 0.02 K/UL (ref 0–0.2)
BASOPHILS NFR BLD: 0.2 %
BUN SERPL-MCNC: 19 MG/DL (ref 7–18)
CALCIUM SERPL-MCNC: 8.9 MG/DL (ref 8.5–10.1)
CO2 SERPL-SCNC: 27 MMOL/L (ref 21–32)
CREAT SERPL-MCNC: 0.84 MG/DL (ref 0.6–1.2)
EOS ABS #: 0.04 K/UL (ref 0–0.5)
EOSINOPHIL NFR BLD AUTO: 350 K/UL (ref 130–400)
GLUCOSE SERPL-MCNC: 105 MG/DL (ref 70–99)
HCT VFR BLD CALC: 41 % (ref 37–47)
HGB BLD-MCNC: 13.9 G/DL (ref 12–16)
IG#: 0.05 K/UL (ref 0–0.02)
IMM GRANULOCYTES NFR BLD AUTO: 15.1 %
LYMPHOCYTES # BLD: 1.41 K/UL (ref 1.2–3.4)
MCH RBC QN AUTO: 33 PG (ref 25–34)
MCHC RBC AUTO-ENTMCNC: 33.9 G/DL (ref 32–36)
MCV RBC AUTO: 97.4 FL (ref 80–100)
MONO ABS #: 0.76 K/UL (ref 0.11–0.59)
MONOCYTES NFR BLD: 8.1 %
NEUT ABS #: 7.08 K/UL (ref 1.4–6.5)
NEUTROPHILS # BLD AUTO: 0.4 %
NEUTROPHILS NFR BLD AUTO: 75.7 %
PMV BLD AUTO: 9.9 FL (ref 7.4–10.4)
POTASSIUM SERPL-SCNC: 4 MMOL/L (ref 3.5–5.1)
RED CELL DISTRIBUTION WIDTH CV: 14.6 % (ref 11.5–14.5)
RED CELL DISTRIBUTION WIDTH SD: 51.3 FL (ref 36.4–46.3)
SODIUM SERPL-SCNC: 138 MMOL/L (ref 136–145)
WBC # BLD AUTO: 9.36 K/UL (ref 4.8–10.8)

## 2018-08-23 ENCOUNTER — HOSPITAL ENCOUNTER (OUTPATIENT)
Dept: HOSPITAL 45 - X.SURG | Age: 73
Discharge: HOME | End: 2018-08-23
Attending: ORTHOPAEDIC SURGERY
Payer: COMMERCIAL

## 2018-08-23 VITALS — DIASTOLIC BLOOD PRESSURE: 67 MMHG | HEART RATE: 73 BPM | OXYGEN SATURATION: 95 % | SYSTOLIC BLOOD PRESSURE: 125 MMHG

## 2018-08-23 VITALS
WEIGHT: 140.3 LBS | BODY MASS INDEX: 23.95 KG/M2 | BODY MASS INDEX: 23.95 KG/M2 | HEIGHT: 64.25 IN | HEIGHT: 64.25 IN | WEIGHT: 140.3 LBS

## 2018-08-23 VITALS — TEMPERATURE: 97.7 F

## 2018-08-23 DIAGNOSIS — M94.211: Primary | ICD-10-CM

## 2018-08-23 DIAGNOSIS — M06.9: ICD-10-CM

## 2018-08-23 DIAGNOSIS — M24.011: ICD-10-CM

## 2018-08-23 DIAGNOSIS — E07.9: ICD-10-CM

## 2018-08-23 DIAGNOSIS — Z96.652: ICD-10-CM

## 2018-08-23 NOTE — DISCHARGE INSTRUCTIONS-SURGCTR
Discharge Instructions


Date of Service


Aug 23, 2018.





Visit


Reason for Visit:  Right Shoulder Rotator Cuff Full Thickness Tear





Discharge


Discharge Diagnosis / Problem:  SAME AS ABOVE





Discharge Goals


Goal(s):  Decrease discomfort, Improve function





Medications


Stopped Medications Name(s):  


stopped blood thinners for a week


Restart Stopped Medication(s):


MAY RESTART 8/23/2018





Activity Recommendations


Activity Limitations:  as noted below


Lifting Limitations:  gradually increase as tolerated


Exercise/Sports Limitations:  gradually increase as tolerated


Shower/Bathe:  tomorrow


Driving or Machine Use:  resume 1 day after discharge





Anesthesia


.





Post Anesthesia Instructions:





If you have had General Anesthesia or IV Sedation:





*  Do not drive today.


*  Resume driving when surgeon permits.


*  Do not make important decisions or sign legal documents today.


*  Call surgeon for:





   1.  Temperature elevations greater than 101 degrees F.


   2.  Uncontrollable pain.


   3.  Excessive bleeding.


   4.  Persistent nausea and vomiting.


   5.  Medication intolerance (nausea, vomiting or rash).





*  For nausea and vomiting use only clear liquids such as: tea, soda, bouillon 

until nausea subsides, then gradually increase diet as tolerated.





*  If you have any concerns or questions, call your surgeon's office.  If 

physician is unavailable and it is an emergency, call 911 or go to the nearest 

emergency room.





.





Instructions / Follow-Up


Instructions / Follow-Up





MEDICATIONS:





*  Resume previous medications unless instructed otherwise by your surgeon.





*  Always take pain medication on a full stomach or with food to avoid upset 

stomach. 





*  Do not drink alcohol or drive while taking narcotics.





*  Ibuprofen or Tylenol may be taken if narcotic not needed.








SPECIAL CARE INSTRUCTIONS:





__ None





_X_ Keep extremity elevated and iced x 48 hours; apply ice 20-30 


    minutes 8-10 times/day. May remove at night.





_X_ Sling (WEAR FOR COMFORT ONLY) 


    __24 hrs/day


    __ Remove at night





__ Shoulder Immobilizer


    __ 24 hrs/day


    __ Remove at night





_X_ Dressing


    __ Maintain until seen in office, may shower with plastic over site


    _X_ Remove dressings in 24-48 hours and then may shower


    _X_ Cover incisions with band-aids after showering


    __ Do not remove steri-strips





Call physician if chills or temperature rises above 102 degrees or pain


unrelieved by prescribed pain medications at [phone].


.





Diet Recommendations


Home Diet:  no limitations


Fluid Restriction:  None





Procedures


Procedures Performed:  


Right Shoulder Arthroscopy, Extensive Debridement, Chondroplasty and loose


body removal





Pending Studies


Studies pending at discharge:  no





Medical Emergencies








.


Who to Call and When:





Medical Emergencies:  If at any time you feel your situation is an emergency, 

please call 911 immediately.





.





Non-Emergent Contact


Non-Emergency issues call your:  Surgeon


Call Non-Emergent contact if:  your pain is not controlled, wound has increased 

drainage, wound has increased redness





.


.








"Provider Documentation" section prepared by Toño Aguilar.








.

## 2018-08-23 NOTE — OPERATIVE REPORT
DATE OF OPERATION:  08/23/2018

 

PREOPERATIVE DIAGNOSIS:  Chondromalacia of the right shoulder with possible

small meniscus tear.

 

POSTOPERATIVE DIAGNOSES:  Severe chondromalacia of the right shoulder with

multiple loose bodies and no rotator cuff tear.

 

PROCEDURE:  Right shoulder diagnostic arthroscopy with removal of loose

bodies and extensive debridement.

 

SURGEON:  Dr. Tim Waggoner.

 

ASSISTANT:  Toño Aguilar PA-C, whose assistance was necessary for

retraction and closure.

 

ANESTHESIA:  General with a right interscalene nerve block.

 

COMPLICATIONS:  None.

 

CONDITION:  Stable to PACU.

 

INDICATIONS:  Sara is a pleasant 73-year-old female who underwent a previous

open rotator cuff repair many years ago.  She fell about 2 months ago and has

been having significant shoulder pain since.  MRI showed mild chondromalacia

and a questionable small cuff tear.  She elected to proceed with arthroscopy.

 

DESCRIPTION OF PROCEDURE:  On 08/23/2018, she arrived at [facility] for the above procedure.  She was seen in the preoperative

holding area and the operative extremity was identified and signed.  She was

given a preoperative antibiotic and a right interscalene nerve block.  She

was taken back to the operating room, laid on table in supine position and

put under general anesthesia.  She was then put into the beachchair position.

 The right shoulder was prepped and draped in sterile fashion.  Time-out was

done.  The patient's operative extremity was properly identified.

 

A scope was introduced in the posterior portal.  Diagnostic arthroscopy

showed grade 4 chondral changes on the humeral head.  There were some mild

grade 3 changes on the glenoid.  An anterior portal was made.  A shaver was

used to start a debridement of the intraarticular structures.  The labrum was

debrided back to stable margins.  The biceps tendon was absent.  The rotator

cuff was intact throughout.  There was evidence of previous repair, but it

was intact.  There was large unstable portions of cartilage which were

flaking off.  Multiple pictures were taken.  A grasper was used to remove the

larger pieces of cartilage which were flaking off.  The scope was brought

into the axillary pouch.  There was a large loose body there and that was

removed with a grasper and a shaver.  The rotator interval was opened up and

there was a very large loose body in the subcoracoid space.  Pictures were

taken.  This loose body was removed as well.  More time was spent completing

a chondroplasty and getting everything back to stable margins. 

Unfortunately, she is a better candidate for shoulder replacement surgery. 

The scope was then put into the subacromial space.  Aside from a little bit

of scar tissue, I did not see much pathology in the subacromial region. 

Arthroscopic instrument removed from the shoulder.  Portal sites were closed

with 3-0 nylon.  She was then placed in a soft dressing and a regular arm

sling.  She was then extubated, transferred to a [facility] and taken to the

postanesthesia care unit in stable condition.  She tolerated the procedure

well.

 

 

I attest to the content of the Intraoperative Record and any orders documented therein. Any exception
s are noted below.

## 2018-08-23 NOTE — [facility] POST OPERATIVE BRIEF NOTE
Immediate Operative Summary


Operative Date


Aug 23, 2018.





Pre-Operative Diagnosis





Right Shoulder Full Rotator Cuff Tear with chondromalacia





Post-Operative Diagnosis





severs chondromalacia with multiple loose bodies, no cuff tear





Procedure(s) Performed





Right Shoulder Arthroscopy, Extensive Debridement, Chondroplasty and loose


body removal





Surgeon


Dr. Waggoner





Assistant Surgeon(s)


JULIO Aguilar PA-C





Estimated Blood Loss


5 ml





Findings


Consistent with Post-Op Diagnosis





Specimens





None





Anesthesia Type


[facility]

## 2020-02-19 NOTE — ANESTHESIOLOGY PROGRESS NOTE
Anesthesia Post Op Note


Date & Time


Aug 23, 2018 at 10:51





Vital Signs


Pain Intensity:  0





Vital Signs Past 12 Hours








  Date Time  Temp Pulse Resp B/P (MAP) Pulse Ox O2 Delivery O2 Flow Rate FiO2


 


8/23/18 10:34 36.2       


 


8/23/18 10:30  64 14 133/73 (84) 93   


 


8/23/18 10:30  65 14     


 


8/23/18 10:26    115/69 (84)    


 


8/23/18 10:25  73 30     


 


8/23/18 10:25  73 30  96   


 


8/23/18 10:20  73 12     


 


8/23/18 10:20  72 12 137/68 (79) 90   


 


8/23/18 10:19      Room Air  


 


8/23/18 10:15  70 11     


 


8/23/18 10:15  69 11 132/69 (84) 96   


 


8/23/18 10:10  68 13     


 


8/23/18 10:10  68 13 134/71 (87) 97   


 


8/23/18 10:05  70 14 138/72 (84) 97   


 


8/23/18 10:05  70 14     


 


8/23/18 10:00  68 15 139/70 (90) 98   


 


8/23/18 10:00  68 15     


 


8/23/18 09:55  67 15 139/67 (84) 96   


 


8/23/18 09:55  67 15     


 


8/23/18 09:50  66 19     


 


8/23/18 09:50  66 19 141/76 (88) 98   


 


8/23/18 09:45 36.2 71 12 155/69 96 Diffusion Mask 8 


 


8/23/18 09:45  68 18     


 


8/23/18 09:45  69 18 138/69 (89) 94   


 


8/23/18 09:44    155/69 (88)    


 


8/23/18 08:50   13     


 


8/23/18 08:45  58      


 


8/23/18 08:45  57 23 175/81 100   


 


8/23/18 08:40  55 11 181/72 100   


 


8/23/18 08:40  56      


 


8/23/18 08:35  57      


 


8/23/18 08:35  57 24 186/81 100   


 


8/23/18 08:33    181/82    


 


8/23/18 08:30  56 0     


 


8/23/18 08:25  57 0     


 


8/23/18 08:20  58 0     


 


8/23/18 08:15  58 0     


 


8/23/18 08:10  58 0     


 


8/23/18 08:05  56 0     


 


8/23/18 08:00  56 0     


 


8/23/18 07:55  57 0     


 


8/23/18 07:50  58 0     


 


8/23/18 07:45  58 0     


 


8/23/18 07:40  58 0     


 


8/23/18 07:35  56 0     


 


8/23/18 07:30  59 0     


 


8/23/18 07:25  57 0     


 


8/23/18 07:10 36.7 58 16 134/69 (90) 98 Room Air  











Notes


Mental Status:  alert / awake / arousable, participated in evaluation


Pt Amnestic to Procedure:  Yes


Nausea / Vomiting:  adequately controlled


Pain:  adequately controlled


Airway Patency, RR, SpO2:  stable & adequate


BP & HR:  stable & adequate


Hydration State:  stable & adequate


Anesthetic Complications:  no major complications apparent


Anesthetic Complications:


nerve block functioning well. Ureteral stone